# Patient Record
Sex: MALE | Race: WHITE
[De-identification: names, ages, dates, MRNs, and addresses within clinical notes are randomized per-mention and may not be internally consistent; named-entity substitution may affect disease eponyms.]

---

## 2022-02-24 ENCOUNTER — HOSPITAL ENCOUNTER (EMERGENCY)
Dept: HOSPITAL 33 - ED | Age: 42
Discharge: HOME | End: 2022-02-24
Payer: MEDICAID

## 2022-02-24 VITALS — SYSTOLIC BLOOD PRESSURE: 112 MMHG | DIASTOLIC BLOOD PRESSURE: 73 MMHG | HEART RATE: 66 BPM

## 2022-02-24 VITALS — OXYGEN SATURATION: 99 %
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
## 2022-02-24 DIAGNOSIS — M51.26: ICD-10-CM

## 2022-02-24 DIAGNOSIS — G89.29: ICD-10-CM

## 2022-02-24 DIAGNOSIS — M51.44: ICD-10-CM
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
## 2022-02-24 DIAGNOSIS — N20.0: ICD-10-CM

## 2022-02-24 DIAGNOSIS — Z72.0: ICD-10-CM

## 2022-02-24 DIAGNOSIS — S39.012A: Primary | ICD-10-CM

## 2022-02-24 PROCEDURE — 99284 EMERGENCY DEPT VISIT MOD MDM: CPT

## 2022-02-24 PROCEDURE — 72128 CT CHEST SPINE W/O DYE: CPT

## 2022-02-24 PROCEDURE — 72131 CT LUMBAR SPINE W/O DYE: CPT

## 2022-02-24 PROCEDURE — 96372 THER/PROPH/DIAG INJ SC/IM: CPT

## 2022-02-24 NOTE — XRAY
Exam: CT of the lumbar spine without IV contrast from 2/24/2022.

                 CTDI: 9.77 mGy



Comparison: None.



Indication: Injured back while lifting a tire on Saturday; fell this morning

getting out of bed; no history of prior back surgeries.



Technique: Non-IV contrast axial images were obtained through the lumbar

spine.  Reconstructed coronal and sagittal images were created and reviewed.



Findings: I see no acute lumbar spine fracture, spondylolisthesis,

spondylolysis, or focal bone destruction.  There is slight loss of anterior

vertebral body heights of L1, L2, and L3.  Some associated mild anterior

lateral vertebral endplate spurring is seen at L2-L3 and L3-L4.  These

findings appear to be chronic.  I also note some small Schmorl's nodes within

the superior and inferior vertebral endplates of L2 and L4, as well as the

inferior vertebral endplates of L1 and L3.  Lumbar interspaces heights are

adequately preserved.  Facet joints appear unremarkable.



I see no evidence of significant lumbar stenosis or lateral neural foraminal

stenosis.  I believe there is some minimal circumferential bulging of the

L1-L2, L2-L3, and L3-L4 discs.  A definite focal herniated disc is not seen on

these images obtained with a sharp bone filter.



On coronal image #27, there is a nonobstructing 1.5 mm stone overlying the

lower pole of the right kidney.  This is also seen on axial image #42.



The visualized sacroiliac joints appear unremarkable.



Impression:



1.  No acute lumbar spine fracture, spondylolisthesis, spondylolysis, or bone

destruction is seen.



2.  There appears to be slight circumferential bulging of the L1-L2, L2-3, and

L3-L4 discs without definite focal disc herniation on the images supplied.  No

lumbar spinal stenosis or lateral neural foraminal stenosis is appreciated.



3.  Other mild degenerative and chronic changes, as discussed above.



4.  Incidental note of a tiny nonobstructing stone within the lower pole of

the right kidney.

## 2022-02-24 NOTE — XRAY
Exam: CT of the thoracic spine without IV contrast from 2/24/2022.

                   CTDI: 11.47 mGy



Comparison: None.



Indication: Injured back lifting a tire on Saturday; fell this morning getting

out of bed; no history of prior back surgeries.



Technique: Non-IV contrast axial images were obtained through the thoracic

spine.  Reconstructed coronal and sagittal images were created and reviewed.



Findings: On the CT  images, there are 12 rib-bearing thoracic vertebra

and 5 non-rib-bearing lumbar-type vertebra.  I note slight convexity of the

upper thoracic spine toward the left centered at T3 which may be due to

minimal levoscoliosis, paravertebral muscular spasm on the right, or a problem

with patient positioning.



I see no acute thoracic spine fracture, AP subluxation, or bone destruction.

Some minimal Schmorl's nodes are seen within lower thoracic vertebral

endplates at T7, T8, and T9.  There is very slight loss of the anterior

vertebral body height of T8 which I believe is old.  The thoracic spinal canal

appears of normal caliber.  The thoracic neural foramen appear patent

throughout.  The facet joints appear unremarkable.



Minor biapical pleural thickening/scarring is seen.  The visualized lungs and

mediastinum appear grossly unremarkable.



Impression:



1.  No acute thoracic spine fracture, AP subluxation, or bone destruction is

seen.



2.  Minimal convexity of the upper thoracic spine toward the left centered at

T3.  See above.



3.  Very small Schmorl's nodes and chronic changes seen within the lower

thoracic spine, as discussed above.

## 2022-08-11 ENCOUNTER — HOSPITAL ENCOUNTER (EMERGENCY)
Dept: HOSPITAL 33 - ED | Age: 42
Discharge: HOME | End: 2022-08-11
Payer: MEDICAID

## 2022-08-11 ENCOUNTER — HOSPITAL ENCOUNTER (EMERGENCY)
Dept: HOSPITAL 33 - ED | Age: 42
LOS: 1 days | Discharge: HOME | End: 2022-08-12
Payer: MEDICAID

## 2022-08-11 DIAGNOSIS — Z53.9: Primary | ICD-10-CM

## 2022-08-11 PROCEDURE — 99281 EMR DPT VST MAYX REQ PHY/QHP: CPT

## 2022-11-20 ENCOUNTER — HOSPITAL ENCOUNTER (EMERGENCY)
Dept: HOSPITAL 33 - ED | Age: 42
Discharge: HOME | End: 2022-11-20
Payer: COMMERCIAL

## 2022-11-20 VITALS — HEART RATE: 68 BPM | DIASTOLIC BLOOD PRESSURE: 95 MMHG | SYSTOLIC BLOOD PRESSURE: 140 MMHG | OXYGEN SATURATION: 98 %

## 2022-11-20 DIAGNOSIS — Z28.310: ICD-10-CM

## 2022-11-20 DIAGNOSIS — K62.89: ICD-10-CM

## 2022-11-20 DIAGNOSIS — Z72.0: ICD-10-CM

## 2022-11-20 DIAGNOSIS — K64.2: Primary | ICD-10-CM

## 2022-11-20 PROCEDURE — 99281 EMR DPT VST MAYX REQ PHY/QHP: CPT

## 2022-11-20 NOTE — ERPHSYRPT
- History of Present Illness


Time Seen by Provider: 11/20/22 10:17


Source: patient


Exam Limitations: no limitations


Patient Subjective Stated Complaint: Rectal pain-hemmorioids


Triage Nursing Assessment: Patient ambulated back to ED and transferred self to 

bed. Patient A+O X 3. Patient's skin pink, warm and dry. Patient's skin pink, 

warm and dry. Patient complains of rectal pain due to hemmoroids. Patient states

he has had hemmoroids for one week.


Physician History: 





Patient complains of rectal pain due to hemorrhoids. Patient states he has had 

hemorrhoids for one week. 


Timing/Duration: week(s) (one week)


Severity: moderate


Associated Symptoms: denies symptoms


Allergies/Adverse Reactions: 








No Known Drug Allergies Allergy (Verified 11/20/22 10:08)


   





Hx Tetanus, Diphtheria Vaccination/Date Given: Yes


Hx Influenza Vaccination/Date Given: No


Hx Pneumococcal Vaccination/Date Given: No


Immunizations Up to Date: Yes





Travel Risk





- International Travel


Have you traveled outside of the country in past 3 weeks: No





- Coronavirus Screening


Are you exhibiting any of the following symptoms?: No





- Vaccine Status


Have you recieved a Covid-19 vaccination: No





- Review of Systems


Constitutional: No Symptoms


Eyes: No Symptoms


Ears, Nose, & Throat: No Symptoms


Respiratory: No Symptoms


Cardiac: No Symptoms


Abdominal/Gastrointestinal: Hematochezia


Genitourinary Symptoms: No Symptoms


Musculoskeletal: No Symptoms


Skin: No Symptoms





- Past Medical History


Pertinent Past Medical History: No





- Past Surgical History


Past Surgical History: Yes





- Social History


Smoking Status: Current every day smoker


How long have you smoked: 20 years


Exposure to second hand smoke: No


Drug Use: marijuana


Patient Lives Alone: No





- Nursing Vital Signs


Nursing Vital Signs: 





                               Initial Vital Signs











Temperature  98.2 F   11/20/22 10:09


 


Pulse Rate  68   11/20/22 10:09


 


Respiratory Rate  18   11/20/22 10:09


 


Blood Pressure  140/95   11/20/22 10:09


 


O2 Sat by Pulse Oximetry  98   11/20/22 10:09








                                   Pain Scale











Pain Intensity                 2

















- Physical Exam


General Appearance: no apparent distress


Eye Exam: PERRL/EOMI


Ears, Nose, Throat Exam: normal ENT inspection


Neck Exam: normal inspection


Respiratory Exam: normal breath sounds


Cardiovascular Exam: regular rate/rhythm


Gastrointestinal/Abdomen Exam: soft, other (hemorrhoids)


Male Genitalia Exam: normal genitalia


Back Exam: normal inspection


Extremity Exam: normal inspection


Neurologic Exam: alert, oriented x 3, cooperative


SpO2: 98





- Course


Nursing assessment & vital signs reviewed: Yes


Ordered Tests: 





Medication Summary











Generic Name Dose Route Start Last Admin





  Trade Name Freq  PRN Reason Stop Dose Admin


 


Hydrocortisone Acetate  25 mg  11/20/22 10:16 





  Hydrocortisone Acetate 25 Mg Supp.Rect  SC  12/20/22 10:15 





  UD PRN  





  HEMORRHOIDS  














- Progress


Progress: unchanged


Counseled pt/family regarding: diagnosis, need for follow-up





- Departure


Departure Disposition: Home


Clinical Impression: 


 Rectal or anal pain





Hemorrhoids


Qualifiers:


 Hemorrhoid type: third degree Qualified Code(s): K64.2 - Third degree 

hemorrhoids





Condition: Stable


Critical Care Time: No


Referrals: 


GIOVANNA APODACA MD [Primary Care Provider] - Follow up/PCP as directed


Instructions:  Hemorrhoids (DC)


Additional Instructions: 


Discharge/Care Plan





JENNY DODSON was seen on 11/20/22 in the Emergency Room. The patient was 

counseled regarding Diagnosis,Lab results, Imaging studies, need for follow up 

and when to return to the Emergency Room.





Prescriptions given:





Discharge Note





I have spoken with the patient and/or caregivers. I have explained the patient's

condition, diagnosis and treatment plan based on the information available to me

at this time. I have answered the patient's and/or caregiver's questions and 

addressed any concerns. The patient and/or caregivers have as good understanding

of the patient's diagnosis, condition and treatment plan as can be expected at 

this point. The vital signs have been stable. The patient's condition is stable 

and appropriate for discharge from the emergency department.





The patient will pursue further outpatient evaluation with the primary care 

physician or other designated or consulting physician as outlined in the 

discharge instructions. The patient and/or caregivers are agreeable to this plan

of care and follow-up instructions have been explained in detail. The patient 

and/or caregivers have received these instruction. The patient/and or caregivers

are aware that any significant change in condition or worsening of symptoms 

should prompt an immediate return to this or the closest emergency department or

call 911. 





JENNY DODSON was seen on 11/20/22 n the Emergency Room. At that time you were 

treated for an emergent condition, during your visit Laboratory, Radiology 

and/or other procedures may have been ordered. It is very important that you 

follow-up with your Primary Care Physician GIOVANNA APODACA within the next 24-

48 hours to review your Emergency Room visit and the final results of testing 

that was ordered.  Some test results such as Urine Cultures, Blood Cultures, and

other cultures if ordered will not be finalized for 24-48 hours.





If you do not have a Primary Care Provider please call the medical records 

department at [phone] ext [phone] to obtain a copy of your results or you may 

sign into our patient portal to obtain these results by visiting us @ 

[email or web address] and completing the following steps:





1. Click on the Patient Portal link





2. Click the Patient Self Enrollment Link to complete the enrollment form and 

entering your Medical Record Number [id number]





3. Once the enrollment form is completed you will receive an email with a 

temporary ID and password at the email address you provided. 





4. Next choose a user name and password. Your user name must be at least 4 

characters long and your password must be at least 4 characters long.





5. Choose a security question from the list and provide your answer to the 

question.





If you already have signed into the Health Portal you may access your Health 

Care Information  24/7 by the following steps:





1. Login to  our website @ [email or web address]





2. Enter your original user name and password.





FAQS





The [facility] Health Portal is an online tool that contains your Lab Results, 

Radiology Reports, Visit History, Discharge Instructions and Health Summary 





Lab and Radiology Results will not be available for 72 hours on the portal.





The Portal is a secure site, passwords are encryted and URLs are re-written so 

they cannot be copied and pasted. You and authorized family members are the only

ones who can access your Portal. Also there is a timeout feature that protects 

your information if you leave the Portal page open.





If you have technical difficulty please use the Contact Us link on the page this

will allow you to submit any questions you have regarding the Portal or you may 

contact the Medical Record Department at [phone].


Prescriptions: 


Hydrocortisone 25 mg Supp*** [Anucort-HC SUPPOSITORY***] 25 mg RC BID #20 

supp.rect
